# Patient Record
(demographics unavailable — no encounter records)

---

## 2024-10-13 NOTE — HISTORY OF PRESENT ILLNESS
[de-identified] : Age: 16 year M, accompanied by in office today by his mother.  PMHx: None Hand Dominance: RHD Chief Complaint: Patient c/o right wrist pain x 09/20/24. Patient reports on 09/20/24 he was playing football, and another player hit into his right wrist with their helmet. Patient reports he was seen at [facility] on 09/21/24 and XR demonstrated a scaphoid fracture. Patient reports they splinted his right wrist. Patient reports taking ibuprofen PRN and icing his wrist with relief.  Trauma:  Patient reports on 09/20/24 he was playing football, and someone hit into his right wrist with their helmet.  Outside Imaging/Treatment: XR at [facility] demonstrated a scaphoid fracture.  OTC Medications: Ibuprofen PRN with relief OT/PT: None Bracing: Ace bandage and splint placed at [facility] Pain worse with: Movement of his wrist Pain better with: Rest, ice, ibuprofen   10/07/2024: Patient is here s/p right scaphoid open reduction internal fixation on 09/26/24. Patient is accompanied by his mother in office today. Patient reports his pain has significantly improved prior to surgery. Patient presents in a right thumb spica, short arm plaster splint. Patient denies numbness/tingling. Patient denies taking medication for pain. Patient denies fever, chills, SOB, CP

## 2024-10-13 NOTE — ASSESSMENT
[FreeTextEntry1] : EXAM Right wrist with well healed incision, no erythema nor drainage. Able to make fist, oppose thumb to small finger and abduct fingers. Sensation intact throughout. <2sec cap refill.  Right wrist radiographs with scaphoid proximal pole fracture, carpus aligned with antegrade screw.  ASSESSMENT/PLAN s/p right scaphoid open reduction internal fixation on 09/26/24 - progressing well postop. Reviewed radiographs. NWB  Procedure - right short arm fiberglass cast applied, patient tolerated well.  F/u 4 weeks; cast off THEN 5 view scaphoid views

## 2024-10-13 NOTE — HISTORY OF PRESENT ILLNESS
[de-identified] : Age: 16 year M, accompanied by in office today by his mother.  PMHx: None Hand Dominance: RHD Chief Complaint: Patient c/o right wrist pain x 09/20/24. Patient reports on 09/20/24 he was playing football, and another player hit into his right wrist with their helmet. Patient reports he was seen at [facility] on 09/21/24 and XR demonstrated a scaphoid fracture. Patient reports they splinted his right wrist. Patient reports taking ibuprofen PRN and icing his wrist with relief.  Trauma:  Patient reports on 09/20/24 he was playing football, and someone hit into his right wrist with their helmet.  Outside Imaging/Treatment: XR at [facility] demonstrated a scaphoid fracture.  OTC Medications: Ibuprofen PRN with relief OT/PT: None Bracing: Ace bandage and splint placed at [facility] Pain worse with: Movement of his wrist Pain better with: Rest, ice, ibuprofen   10/07/2024: Patient is here s/p right scaphoid open reduction internal fixation on 09/26/24. Patient is accompanied by his mother in office today. Patient reports his pain has significantly improved prior to surgery. Patient presents in a right thumb spica, short arm plaster splint. Patient denies numbness/tingling. Patient denies taking medication for pain. Patient denies fever, chills, SOB, CP

## 2024-11-01 NOTE — HISTORY OF PRESENT ILLNESS
[de-identified] : Age: 16 year M, accompanied by in office today by his mother.  PMHx: None Hand Dominance: RHD Chief Complaint: Patient c/o right wrist pain x 09/20/24. Patient reports on 09/20/24 he was playing football, and another player hit into his right wrist with their helmet. Patient reports he was seen at [facility] on 09/21/24 and XR demonstrated a scaphoid fracture. Patient reports they splinted his right wrist. Patient reports taking ibuprofen PRN and icing his wrist with relief.  Trauma:  Patient reports on 09/20/24 he was playing football, and someone hit into his right wrist with their helmet.  Outside Imaging/Treatment: XR at [facility] demonstrated a scaphoid fracture.  OTC Medications: Ibuprofen PRN with relief OT/PT: None Bracing: Ace bandage and splint placed at [facility] Pain worse with: Movement of his wrist Pain better with: Rest, ice, ibuprofen   10/07/2024: Patient is here s/p right scaphoid open reduction internal fixation on 09/26/24. Patient is accompanied by his mother in office today. Patient reports his pain has significantly improved prior to surgery. Patient presents in a right thumb spica, short arm plaster splint. Patient denies numbness/tingling. Patient denies taking medication for pain. Patient denies fever, chills, SOB, CP  10/31/24: Follow up right wrist. Patients states he is feeling well, reporting no acute pain or discomfort. Patient reports that he has a game tonight and was interested in playing. Denies numbness/tingling.

## 2024-11-01 NOTE — ASSESSMENT
[FreeTextEntry1] : EXAM Right wrist with well healed incision, no erythema nor drainage. Able to make fist, oppose thumb to small finger and abduct fingers. Sensation intact throughout. <2sec cap refill.  Right wrist radiographs with scaphoid proximal pole fracture, carpus aligned with antegrade screw. +callus, alignment maintained. (5-view)  ASSESSMENT/PLAN s/p right scaphoid open reduction internal fixation on 09/26/24 - progressing well postop. Reviewed radiographs. NWB  Procedure - right short arm fiberglass cast applied, patient tolerated well.  F/u 4 weeks; cast off THEN 5 view scaphoid views

## 2024-11-23 NOTE — ASSESSMENT
[FreeTextEntry1] : EXAM Right wrist with well healed incision, no erythema nor drainage. Able to make fist, oppose thumb to small finger and abduct fingers. Sensation intact throughout. <2sec cap refill.  Right wrist radiographs with scaphoid proximal pole fracture, carpus aligned with antegrade screw. Healed, alignment maintained. (5-view)  ASSESSMENT/PLAN s/p right scaphoid open reduction internal fixation on 09/26/24 - progressing well postop. Reviewed radiographs. Ease into use. OT for ROM.  Procedure - right short arm fiberglass cast removed, patient tolerated well.  F/u 8 weeks; cast off THEN 5 view scaphoid views

## 2024-11-23 NOTE — HISTORY OF PRESENT ILLNESS
[de-identified] : Age: 16 year M, accompanied by in office today by his mother.  PMHx: None Hand Dominance: RHD Chief Complaint: Patient c/o right wrist pain x 09/20/24. Patient reports on 09/20/24 he was playing football, and another player hit into his right wrist with their helmet. Patient reports he was seen at [facility] on 09/21/24 and XR demonstrated a scaphoid fracture. Patient reports they splinted his right wrist. Patient reports taking ibuprofen PRN and icing his wrist with relief.  Trauma:  Patient reports on 09/20/24 he was playing football, and someone hit into his right wrist with their helmet.  Outside Imaging/Treatment: XR at [facility] demonstrated a scaphoid fracture.  OTC Medications: Ibuprofen PRN with relief OT/PT: None Bracing: Ace bandage and splint placed at [facility] Pain worse with: Movement of his wrist Pain better with: Rest, ice, ibuprofen   10/07/2024: Patient is here s/p right scaphoid open reduction internal fixation on 09/26/24. Patient is accompanied by his mother in office today. Patient reports his pain has significantly improved prior to surgery. Patient presents in a right thumb spica, short arm plaster splint. Patient denies numbness/tingling. Patient denies taking medication for pain. Patient denies fever, chills, SOB, CP  10/31/24: Follow up right wrist. Patients states he is feeling well, reporting no acute pain or discomfort. Patient reports that he has a game tonight and was interested in playing. Denies numbness/tingling.   11/22/24: s/p right scaphoid ORIF [09/26/24]. Patient reports that he is doing well, reporting no pain or discomfort at this time. Denies numbness/tingling.  ***Accompanied by mother***

## 2024-11-23 NOTE — HISTORY OF PRESENT ILLNESS
[de-identified] : Age: 16 year M, accompanied by in office today by his mother.  PMHx: None Hand Dominance: RHD Chief Complaint: Patient c/o right wrist pain x 09/20/24. Patient reports on 09/20/24 he was playing football, and another player hit into his right wrist with their helmet. Patient reports he was seen at [facility] on 09/21/24 and XR demonstrated a scaphoid fracture. Patient reports they splinted his right wrist. Patient reports taking ibuprofen PRN and icing his wrist with relief.  Trauma:  Patient reports on 09/20/24 he was playing football, and someone hit into his right wrist with their helmet.  Outside Imaging/Treatment: XR at [facility] demonstrated a scaphoid fracture.  OTC Medications: Ibuprofen PRN with relief OT/PT: None Bracing: Ace bandage and splint placed at [facility] Pain worse with: Movement of his wrist Pain better with: Rest, ice, ibuprofen   10/07/2024: Patient is here s/p right scaphoid open reduction internal fixation on 09/26/24. Patient is accompanied by his mother in office today. Patient reports his pain has significantly improved prior to surgery. Patient presents in a right thumb spica, short arm plaster splint. Patient denies numbness/tingling. Patient denies taking medication for pain. Patient denies fever, chills, SOB, CP  10/31/24: Follow up right wrist. Patients states he is feeling well, reporting no acute pain or discomfort. Patient reports that he has a game tonight and was interested in playing. Denies numbness/tingling.   11/22/24: s/p right scaphoid ORIF [09/26/24]. Patient reports that he is doing well, reporting no pain or discomfort at this time. Denies numbness/tingling.  ***Accompanied by mother***

## 2025-01-04 NOTE — HISTORY OF PRESENT ILLNESS
[de-identified] : Age: 16 year M, accompanied by in office today by his mother.  PMHx: None Hand Dominance: RHD Chief Complaint: Patient c/o right wrist pain x 09/20/24. Patient reports on 09/20/24 he was playing football, and another player hit into his right wrist with their helmet. Patient reports he was seen at [facility] on 09/21/24 and XR demonstrated a scaphoid fracture. Patient reports they splinted his right wrist. Patient reports taking ibuprofen PRN and icing his wrist with relief.  Trauma:  Patient reports on 09/20/24 he was playing football, and someone hit into his right wrist with their helmet.  Outside Imaging/Treatment: XR at [facility] demonstrated a scaphoid fracture.  OTC Medications: Ibuprofen PRN with relief OT/PT: None Bracing: Ace bandage and splint placed at [facility] Pain worse with: Movement of his wrist Pain better with: Rest, ice, ibuprofen   10/07/2024: Patient is here s/p right scaphoid open reduction internal fixation on 09/26/24. Patient is accompanied by his mother in office today. Patient reports his pain has significantly improved prior to surgery. Patient presents in a right thumb spica, short arm plaster splint. Patient denies numbness/tingling. Patient denies taking medication for pain. Patient denies fever, chills, SOB, CP  10/31/24: Follow up right wrist. Patients states he is feeling well, reporting no acute pain or discomfort. Patient reports that he has a game tonight and was interested in playing. Denies numbness/tingling.   11/22/24: s/p right scaphoid ORIF [09/26/24]. Patient reports that he is doing well, reporting no pain or discomfort at this time. Denies numbness/tingling.  ***Accompanied by mother***  1/3/25: Follow up, s/p right scaphoid ORIF (9/26/24). Patient states he is feeling great. +++accompanied by mother+++

## 2025-01-04 NOTE — HISTORY OF PRESENT ILLNESS
[de-identified] : Age: 16 year M, accompanied by in office today by his mother.  PMHx: None Hand Dominance: RHD Chief Complaint: Patient c/o right wrist pain x 09/20/24. Patient reports on 09/20/24 he was playing football, and another player hit into his right wrist with their helmet. Patient reports he was seen at [facility] on 09/21/24 and XR demonstrated a scaphoid fracture. Patient reports they splinted his right wrist. Patient reports taking ibuprofen PRN and icing his wrist with relief.  Trauma:  Patient reports on 09/20/24 he was playing football, and someone hit into his right wrist with their helmet.  Outside Imaging/Treatment: XR at [facility] demonstrated a scaphoid fracture.  OTC Medications: Ibuprofen PRN with relief OT/PT: None Bracing: Ace bandage and splint placed at [facility] Pain worse with: Movement of his wrist Pain better with: Rest, ice, ibuprofen   10/07/2024: Patient is here s/p right scaphoid open reduction internal fixation on 09/26/24. Patient is accompanied by his mother in office today. Patient reports his pain has significantly improved prior to surgery. Patient presents in a right thumb spica, short arm plaster splint. Patient denies numbness/tingling. Patient denies taking medication for pain. Patient denies fever, chills, SOB, CP  10/31/24: Follow up right wrist. Patients states he is feeling well, reporting no acute pain or discomfort. Patient reports that he has a game tonight and was interested in playing. Denies numbness/tingling.   11/22/24: s/p right scaphoid ORIF [09/26/24]. Patient reports that he is doing well, reporting no pain or discomfort at this time. Denies numbness/tingling.  ***Accompanied by mother***  1/3/25: Follow up, s/p right scaphoid ORIF (9/26/24). Patient states he is feeling great. +++accompanied by mother+++

## 2025-01-04 NOTE — ASSESSMENT
[FreeTextEntry1] : EXAM Right wrist with well healed incision, no erythema nor drainage. Able to make fist, oppose thumb to small finger and abduct fingers. Sensation intact throughout. <2sec cap refill.  Right wrist radiographs with scaphoid proximal pole fracture, carpus aligned with antegrade screw. Healed, alignment maintained. (5-view)  ASSESSMENT/PLAN s/p right scaphoid open reduction internal fixation on 09/26/24 - progressing well postop. Reviewed radiographs. Ease into use. OT for ROM. WBAT  F/u 16 weeks; cast off THEN 5 view scaphoid views

## 2025-03-20 NOTE — PHYSICAL EXAM
[TextEntry] : Gen: Awake, alert, not in distress well appearing Ears: bilateral tympanic membranes normal light reflex  normal canals Eyes: PERRL Pharynx: non erythematous, palate normal Neck: supple  Cardiac: normal rate, regular rhythm, S1,S2 normal, no murmur Lungs : clear to auscultation  Abdomen: soft, not tender, not distended, , no hepatosplenomegaly Musculoskeletal : full range of motion of hips, knees and ankles, normal gait Neuro: no focal deficits  Quique stage : 5 Testes bilateral descended, no hernia Back: mild thoracic asymmetry

## 2025-03-20 NOTE — HISTORY OF PRESENT ILLNESS
[Mother] : mother [Yes] : Patient goes to dentist yearly [No] : No cigarette smoke exposure [FreeTextEntry7] : [address] [facility] [FreeTextEntry1] : ANDREAS  is here for 17 year  well child visit[  Appetite: good  increase veggies  Drugs: does not use electronic nicotine delivery system, does not use tobacco, does not use drugs, does not drink alcohol.  Safety: uses safety belts/safety equipment .  Patient is doing well at home. Past medical history reviewed. 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

## 2025-03-20 NOTE — HISTORY OF PRESENT ILLNESS
[Mother] : mother [Yes] : Patient goes to dentist yearly [No] : No cigarette smoke exposure [FreeTextEntry7] : 17 YR [facility] [FreeTextEntry1] : ANDREAS  is here for 17 year  well child visit[  Appetite: good  increase veggies  Drugs: does not use electronic nicotine delivery system, does not use tobacco, does not use drugs, does not drink alcohol.  Safety: uses safety belts/safety equipment .  Patient is doing well at home. Past medical history reviewed. 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

## 2025-03-20 NOTE — PLAN
[TextEntry] : Counseling for all components of the vaccines given today (see orders below) discussed with patient and patients parent/legal guardian.  All questions answered  COUNSELING/EDUCATION  Nutritional counseling: Increase vegetables Reviewed 5-2-1-0 Healthy Habits Questionnaire observe asymmetry mild, muscular 17 y.o discussed     CARE COORDINATION  reviewed  Immunizations reviewed   Information discussed with patient and parent/guardian.       The following 15 to 21 year anticipatory guidance topics were discussed and/or handouts given: physical growth and development, social and academic competence, emotional well-being, risk reduction and  injury prevention. Counseling for nutrition and physical activity was provided.   Sports/Physical Activity Participation Clearance: Full Activity without restrictions including Physical Education & Athletics   I have examined the above-named student and completed the preparticipation physical evaluation. The patient  athlete does not present apparent clinical contraindications to practice and participate in sport(s) as outlined above. . If conditions arise after the athlete has been cleared for participation, the physician may rescind the clearance until the problem is resolved and the potential consequences are completely explained to the athlete (and parents/guardians).         Follow up in one year.

## 2025-03-20 NOTE — HISTORY OF PRESENT ILLNESS
[Mother] : mother [Yes] : Patient goes to dentist yearly [No] : No cigarette smoke exposure [FreeTextEntry7] : 17 [address] [facility] [FreeTextEntry1] : ANDREAS  is here for 17 year  well child visit[  Appetite: good  increase veggies  Drugs: does not use electronic nicotine delivery system, does not use tobacco, does not use drugs, does not drink alcohol.  Safety: uses safety belts/safety equipment .  Patient is doing well at home. Past medical history reviewed. 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